# Patient Record
Sex: FEMALE | Race: WHITE
[De-identification: names, ages, dates, MRNs, and addresses within clinical notes are randomized per-mention and may not be internally consistent; named-entity substitution may affect disease eponyms.]

---

## 2018-10-24 LAB
HCT VFR BLD CALC: 39 % (ref 36–45)
LYMPHOCYTES # SPEC AUTO: 2 K/UL (ref 0.7–4.9)
MCH RBC QN AUTO: 33.1 PG (ref 27–35)
MCV RBC: 97.3 FL (ref 80–100)
PMV BLD: 8.6 FL (ref 7.6–11.3)
RBC # BLD: 4.01 M/UL (ref 3.86–4.86)

## 2018-10-26 ENCOUNTER — HOSPITAL ENCOUNTER (OUTPATIENT)
Dept: HOSPITAL 97 - OR | Age: 23
Discharge: HOME | End: 2018-10-26
Attending: SURGERY
Payer: COMMERCIAL

## 2018-10-26 VITALS — DIASTOLIC BLOOD PRESSURE: 68 MMHG | SYSTOLIC BLOOD PRESSURE: 110 MMHG | OXYGEN SATURATION: 100 % | TEMPERATURE: 98.2 F

## 2018-10-26 DIAGNOSIS — L02.214: Primary | ICD-10-CM

## 2018-10-26 PROCEDURE — 85025 COMPLETE CBC W/AUTO DIFF WBC: CPT

## 2018-10-26 PROCEDURE — 36415 COLL VENOUS BLD VENIPUNCTURE: CPT

## 2018-10-26 PROCEDURE — 87070 CULTURE OTHR SPECIMN AEROBIC: CPT

## 2018-10-26 PROCEDURE — 87205 SMEAR GRAM STAIN: CPT

## 2018-10-26 PROCEDURE — 87075 CULTR BACTERIA EXCEPT BLOOD: CPT

## 2018-10-26 PROCEDURE — 0J9C0ZZ DRAINAGE OF PELVIC REGION SUBCUTANEOUS TISSUE AND FASCIA, OPEN APPROACH: ICD-10-PCS

## 2018-10-26 PROCEDURE — 81025 URINE PREGNANCY TEST: CPT

## 2018-10-26 NOTE — OP
Date of Procedure:  10/26/2018



Surgeon:  Drew Palacios MD



Preoperative Diagnosis:  Right groin abscess.



Postoperative Diagnosis:  Right groin abscess.



Procedure:  Incision, drainage, and debridement of right groin abscess.



Estimated Blood Loss:  Minimal.



Specimen:  Pus.



Finding:  As above.



Anesthesia:  General.



Complications:  None.



Disposition:  The patient tolerated the procedure in stable condition, taken to Recovery in good gene
ral condition.



Operative Note:  The patient was brought to the OR and placed in supine position.  General anesthesia
 was begun.  The patient placed in the frog-leg position.  Prepped and draped in the usual sterile fa
shion.  Marcaine 0.5% was infiltrated locally.  A 15-blade was used to make approximately a 3 cm inci
shady.  Subcutaneous tissue divided.  Purulence evacuated.  Cultures done.  Wound irrigated.  Bleeding
 controlled with cautery.  Necrotic tissue debrided.  Wound packed with wet-to-dry normal saline dres
sing change.  The patient tolerated the 

procedure in stable condition and taken to Recovery in good general condition.





AS/RAVI

DD:  10/26/2018 08:46:59Voice ID:  [id number]

DT:  10/26/2018 14:15:50Report ID:  [id number]

## 2018-10-26 NOTE — DS
Date of Discharge:  10/26/2018



Discharge Note:  The patient will go to Day Surgery and home when stable.



Disposition:  Home.



Condition:  Stable.



Discharge Instructions:  Resume home medicine and diet.  Activity as tolerated.  No heavy lifting.  R
emove outer dressing in a.m.  Shower.  Wet-to-dry normal saline dressing changes daily.  Tylenol No. 
3 1 tablet p.o. q.4 p.r.n. pain.  Bactrim DS 1 tablet p.o. b.i.d.  Follow up in my office in 2 weeks.
  Call for appointment.





AS/RAVI

DD:  10/26/2018 08:46:59Voice ID:  [id number]

DT:  10/26/2018 14:16:44Report ID:  [id number]

## 2019-07-17 ENCOUNTER — HOSPITAL ENCOUNTER (INPATIENT)
Dept: HOSPITAL 97 - 2ND-WC | Age: 24
LOS: 2 days | Discharge: HOME | End: 2019-07-19
Attending: SPECIALIST | Admitting: SPECIALIST
Payer: COMMERCIAL

## 2019-07-17 DIAGNOSIS — Z3A.40: ICD-10-CM

## 2019-07-17 DIAGNOSIS — O26.893: ICD-10-CM

## 2019-07-17 DIAGNOSIS — O48.0: Primary | ICD-10-CM

## 2019-07-17 DIAGNOSIS — Z67.91: ICD-10-CM

## 2019-07-17 LAB
HCT VFR BLD CALC: 38.8 % (ref 36–45)
LYMPHOCYTES # SPEC AUTO: 1.4 K/UL (ref 0.7–4.9)
PMV BLD: 11.1 FL (ref 7.6–11.3)
RBC # BLD: 4 M/UL (ref 3.86–4.86)
UA DIPSTICK W REFLEX MICRO PNL UR: (no result)

## 2019-07-17 PROCEDURE — 86850 RBC ANTIBODY SCREEN: CPT

## 2019-07-17 PROCEDURE — 86901 BLOOD TYPING SEROLOGIC RH(D): CPT

## 2019-07-17 PROCEDURE — 36415 COLL VENOUS BLD VENIPUNCTURE: CPT

## 2019-07-17 PROCEDURE — 85014 HEMATOCRIT: CPT

## 2019-07-17 PROCEDURE — 87340 HEPATITIS B SURFACE AG IA: CPT

## 2019-07-17 PROCEDURE — 85025 COMPLETE CBC W/AUTO DIFF WBC: CPT

## 2019-07-17 PROCEDURE — 86592 SYPHILIS TEST NON-TREP QUAL: CPT

## 2019-07-17 PROCEDURE — 85461 HEMOGLOBIN FETAL: CPT

## 2019-07-17 PROCEDURE — 81003 URINALYSIS AUTO W/O SCOPE: CPT

## 2019-07-17 RX ADMIN — SODIUM CHLORIDE, SODIUM LACTATE, POTASSIUM CHLORIDE, AND CALCIUM CHLORIDE SCH MLS: .6; .31; .03; .02 INJECTION, SOLUTION INTRAVENOUS at 18:00

## 2019-07-17 NOTE — XMS REPORT
Patient Summary Document

 Created on:2019



Patient:THOMAS ROUSE

Sex:Female

:1995

External Reference #:[id number]





Demographics







 Address  [address] [place] [id number]

 

 Home Phone  [phone]

 

 Preferred Language  Unknown

 

 Marital Status  Unknown

 

 Orthodox Affiliation  Unknown

 

 Race  Unknown

 

 Additional Race(s)  Unavailable

 

 Ethnic Group  Unknown









Author







 Organization  [facility]

 

 Address  [address] [place] [address] Dr. Ramos [address] [place] [id number]

 

 Phone  [phone]









Care Team Providers







 Name  Role  Phone

 

 Unavailable  Unavailable  Unavailable









Problems

This patient has no known problems.



Allergies, Adverse Reactions, Alerts

This patient has no known allergies or adverse reactions.



Medications

This patient has no known medications.

## 2019-07-17 NOTE — P.PN
Date of Service: 07/17/19





Cx 1+ cm, vtx minus 2station.  FSE attempted, AROM but electrode not able to be 

attached.  Will start pitocin in place of misoprostel.  Plan explained to 

patient and partner.

## 2019-07-17 NOTE — P.BOP
Preoperative diagnosis: 40+wk pregnancy


Postoperative diagnosis: same, delivery viable female infant


Primary procedure: induction of labor, SCVD viable infant


Estimated blood loss: 250ml


Specimen: none


Findings: CAN X1


Anesthesia: epidural


Complications: None


Transferred to: Other (272)


Condition: Good

## 2019-07-18 LAB — RPR SER QL: (no result)

## 2019-07-18 RX ADMIN — Medication SCH: at 07:00

## 2019-07-18 RX ADMIN — Medication SCH: at 14:33

## 2019-07-18 RX ADMIN — SODIUM CHLORIDE, SODIUM LACTATE, POTASSIUM CHLORIDE, AND CALCIUM CHLORIDE SCH: .6; .31; .03; .02 INJECTION, SOLUTION INTRAVENOUS at 07:20

## 2019-07-18 RX ADMIN — SODIUM CHLORIDE, SODIUM LACTATE, POTASSIUM CHLORIDE, AND CALCIUM CHLORIDE SCH: .6; .31; .03; .02 INJECTION, SOLUTION INTRAVENOUS at 14:00

## 2019-07-18 RX ADMIN — IBUPROFEN PRN MG: 200 TABLET, SUGAR COATED ORAL at 02:35

## 2019-07-18 RX ADMIN — IBUPROFEN PRN MG: 200 TABLET, SUGAR COATED ORAL at 10:10

## 2019-07-18 RX ADMIN — IBUPROFEN PRN MG: 200 TABLET, SUGAR COATED ORAL at 19:35

## 2019-07-18 NOTE — P.PN
Date of Service: 07/18/19


S-No complaints


O-Afeb, vs stable, 34.6 pp hct.  cord blood + so will receive rhogam.


A-Satisfactory


P-Home tomorrow am if all well.

## 2019-07-18 NOTE — PREOPHP
Date of Admission:  2019



History Of Present Illness:  Ms. Dela Cruz is a 23-year-old ,  female,  2, para
 1-0-0-1, now at approximately 40+ weeks gestation.  She will be admitted for induction of labor seco
ndary to post-date pregnancy.  She has been followed by me during this pregnancy without significant 
complications other than history of irregular menses with ultrasound established an EDC of .  Rh
 negative blood type and anemia.



Past Medical History:  Please see prenatal record.



Family History:  Please see prenatal record.



Review of Systems:

She reports no recent cough, cold, fever, or chills.  No recent nausea or vomiting.  She denies any b
reast lumps or breast knots.  She denies any bowel or bladder issues.  Infant has been active.



Physical Examination:

General:  Reveals  female in no apparent distress. 

Neck:  Supple without adenopathy or thyromegaly. 

Lungs:  Clear. 

Cardiac:  Regular rate and rhythm without murmurs. 

Breasts:  Not examined. 

Abdomen:  Estimated fetal weight of 7+ pounds. 

Pelvic:  Cervix, extremely posterior to spec, 75% effaced, vertex presentation, fingertip. 

Extremities:  No cyanosis, clubbing, or edema.



Impression:  40+ week pregnancy, Rh negative blood type.



Plan:  We will induce with misoprostol this evening, 25 mg q.4 hours.





AUSTIN/RAVI

DD:  2019 08:47:48Voice ID:  [id number]

## 2019-07-19 VITALS — SYSTOLIC BLOOD PRESSURE: 106 MMHG | TEMPERATURE: 98 F | DIASTOLIC BLOOD PRESSURE: 58 MMHG

## 2019-07-19 PROCEDURE — 3E033VJ INTRODUCTION OF OTHER HORMONE INTO PERIPHERAL VEIN, PERCUTANEOUS APPROACH: ICD-10-PCS

## 2019-07-19 RX ADMIN — IBUPROFEN PRN MG: 200 TABLET, SUGAR COATED ORAL at 09:30

## 2019-07-19 NOTE — DS
Final Hospital Discharge Diagnosis:  40 plus week pregnancy delivered.



Complications:  None.



Procedures:  Artificial rupture of membranes, Pitocin induction of labor, placement of epidural pankaj
ter, spontaneous controlled vaginal delivery of viable female infant.



Hospital Course:  The patient is a 23-year-old  female,  2, para 1-0-0-1, at 40 plus 
weeks gestation, admitted for induction of labor secondary to postdate pregnancy.  She delivered 8 po
unds 3-ounce female infant, Apgar 9 and 9 with epidural anesthesia.  She was dismissed on the first p
ostpartum day, ambulatory, on a select diet with routine postvaginal delivery activity restrictions, 
to be seen back in the office in 1 week and 6 weeks.  Because she is Rh negative blood work, she rece
ived RhoGAM because cord blood was positive.  Lab included an admission hemoglobin and hematocrit of 
13.0 and 38.8, on dismissal at 34.6.  She had a negative urinalysis.  RPR was negative.  She was dism
issed to continue taking her prenatal iron and vitamins with usual postvaginal delivery activity rest
rictions.





AUSTIN/RAVI

DD:  2019 07:18:12Voice ID:  [id number]

DT:  2019 08:04:41Report ID:  [id number]

## 2019-07-19 NOTE — DN
Surgeon:  Wilfredo Moore MD



Lakeshia is 23-year-old  female,  2, para 1-0-0-1, at 40 plus weeks gestation, schedu
led for misoprostol induction of labor.  However, prior to placement of misoprostol, she was noted to
 be 1+ to 2 cm dilated.  Because of this, Pitocin induction of labor was begun.  Artificial rupture o
f membranes was performed by attempted placement of fetal scalp electrode.  Mild meconium staining of
 the fluid was noted.  Pitocin was begun.  She had a first stage of labor of approximately 3 hours an
d 50 minutes, second stage of labor of approximately 40 minutes.  She delivered by spontaneous contro
lled vaginal delivery, an 8 pounds 3-ounce female infant, Apgar 8 and 9.  Cord around the neck x1 was
 noted at the time of delivery.  Infant was delivered.  The cord was clamped after milking the cord t
oward the infant cut and the infant placed on mother's upper abdomen.  Cord blood was obtained.  Plac
enta was manually removed after cord  with gentle traction.  Intrauterine exam revealed no r
etained tissue.  The patient suffered no lacerations.  Estimated total blood loss was approximately 2
50 cc.  The patient had received epidural anesthesia with good effect and tolerated all procedures we
ll.





AUSTIN/RAVI

DD:  2019 07:16:29Voice ID:  [id number]

DT:  2019 07:51:44Report ID:  [id number]